# Patient Record
Sex: FEMALE | Race: BLACK OR AFRICAN AMERICAN | NOT HISPANIC OR LATINO | URBAN - NONMETROPOLITAN AREA
[De-identification: names, ages, dates, MRNs, and addresses within clinical notes are randomized per-mention and may not be internally consistent; named-entity substitution may affect disease eponyms.]

---

## 2022-03-14 ENCOUNTER — OFFICE (OUTPATIENT)
Dept: URBAN - NONMETROPOLITAN AREA CLINIC 5 | Facility: CLINIC | Age: 37
End: 2022-03-14

## 2022-03-14 VITALS
HEIGHT: 63 IN | SYSTOLIC BLOOD PRESSURE: 141 MMHG | HEART RATE: 85 BPM | RESPIRATION RATE: 18 BRPM | WEIGHT: 176 LBS | DIASTOLIC BLOOD PRESSURE: 81 MMHG

## 2022-03-14 DIAGNOSIS — R14.0 ABDOMINAL DISTENSION (GASEOUS): ICD-10-CM

## 2022-03-14 DIAGNOSIS — K59.09 OTHER CONSTIPATION: ICD-10-CM

## 2022-03-14 DIAGNOSIS — R10.33 PERIUMBILICAL PAIN: ICD-10-CM

## 2022-03-14 PROCEDURE — 99204 OFFICE O/P NEW MOD 45 MIN: CPT | Performed by: INTERNAL MEDICINE

## 2022-03-14 NOTE — SERVICENOTES
Patient reports periumbilical discomfort and concern for a hernia.  No defect was appreciated on exam.  Will place referral to Dr. Gonzalez to consider further imaging and discuss surgical intervention.  For patient's constipation I have counseled her on Citrucel use as above.  If at follow-up with persistent symptoms will trial Linzess.  Also counseled on bloating measures as above.  Handout was provided.

## 2022-03-14 NOTE — SERVICEHPINOTES
Fely Denis   is a   35 yo  female  with no significant past medical history who presents to establish care for periumbilical discomfort and concern for a hernia.  Patient reports she thinks she has a hernia around her navel.  It will occasionally be painful.  She denies previously having abdominal surgery or undergoing any cross-sectional imaging.  She reports it has been there for over a year.  On exam no obvious defect was appreciated.  Patient does report today that she is constipated at baseline and her bowel movements are irregular.  She typically moves her bowels 1-2 times weekly.  It does take her a long time to go but she denies straining with bowel movements or passing hard stools.  She denies any nausea, vomiting, diarrhea, dysphagia, bright blood per rectum, melena, or mucus in her stools.  She denies a family history of colorectal cancer.  She denies smoking, EtOH use, or illicit drug use.  She will use NSAIDs around the time of her period.  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  She previously attempted Linzess for bowel movements.  She reports it was expensive.  She has never attempted Citrucel for regularity.  She also endorses significant bloating today.

## 2022-09-07 ENCOUNTER — OFFICE (OUTPATIENT)
Dept: URBAN - NONMETROPOLITAN AREA CLINIC 5 | Facility: CLINIC | Age: 37
End: 2022-09-07